# Patient Record
Sex: MALE | Race: OTHER | HISPANIC OR LATINO
[De-identification: names, ages, dates, MRNs, and addresses within clinical notes are randomized per-mention and may not be internally consistent; named-entity substitution may affect disease eponyms.]

---

## 2017-04-10 ENCOUNTER — TRANSCRIPTION ENCOUNTER (OUTPATIENT)
Age: 42
End: 2017-04-10

## 2017-12-06 ENCOUNTER — TRANSCRIPTION ENCOUNTER (OUTPATIENT)
Age: 42
End: 2017-12-06

## 2018-02-25 ENCOUNTER — TRANSCRIPTION ENCOUNTER (OUTPATIENT)
Age: 43
End: 2018-02-25

## 2020-02-14 ENCOUNTER — APPOINTMENT (OUTPATIENT)
Dept: OTOLARYNGOLOGY | Facility: CLINIC | Age: 45
End: 2020-02-14
Payer: MEDICAID

## 2020-02-14 ENCOUNTER — OUTPATIENT (OUTPATIENT)
Dept: OUTPATIENT SERVICES | Facility: HOSPITAL | Age: 45
LOS: 1 days | Discharge: HOME | End: 2020-02-14

## 2020-02-14 VITALS
DIASTOLIC BLOOD PRESSURE: 79 MMHG | BODY MASS INDEX: 29.8 KG/M2 | WEIGHT: 220 LBS | HEART RATE: 83 BPM | HEIGHT: 72 IN | SYSTOLIC BLOOD PRESSURE: 116 MMHG

## 2020-02-14 PROCEDURE — 31575 DIAGNOSTIC LARYNGOSCOPY: CPT

## 2020-02-14 PROCEDURE — 99204 OFFICE O/P NEW MOD 45 MIN: CPT | Mod: 25

## 2020-02-14 NOTE — PROCEDURE
[Unable to Cooperate with Mirror] : patient unable to cooperate with mirror [Obstruction] : acute airway obstruction evaluation [Image(s) Captured] : image(s) captured and filed [Complicated Symptoms] : complicated symptoms requiring more thorough examination than provided by mirror [Oxymetazoline HCl] : oxymetazoline HCl [Topical Lidocaine] : topical lidocaine [Flexible Endoscope] : examined with the flexible endoscope [Serial Number: ___] : Serial Number: [unfilled] [Normal] : normal [Velopharynx ___ %] : the velopharynx was [unfilled]U% collapsed [True Vocal Cords Paralysis] : no true vocal cord paralysis [True Vocal Cords Erythematous] : no true vocal cord edema [True Vocal Cords Schilling's Nodules] : no true vocal cord nodules [Glottis Arytenoid Cartilages] : no arytenoid granulomas [Glottis Arytenoid Cartilages Erythema] : no arytenoid erythema [de-identified] : Patient was placed in the examination chair in a sitting position. The nose was decongested with oxymetazoline nasal solution. The airway was anesthetized with 4% Xylocaine.  The back of the throat was anesthetized with Cetacaine. Direct flexible/rigid video endoscopy was performed. Findings revealed:\par Patient still has a deviated nasal septum to the right anteriorly nasopharynx causes a positive Müller maneuver larynx epiglottis vocal cords base of tongue normal [de-identified] : MARY [FreeTextEntry3] : + Rocha

## 2020-02-14 NOTE — PHYSICAL EXAM
[] : septum deviated to the right [Midline] : trachea located in midline position [Normal] : orientation to person, place, and time: normal [de-identified] : 2+ [de-identified] : Positive Rocha maneuver

## 2020-02-14 NOTE — REASON FOR VISIT
[Initial Consultation] : an initial consultation for [Other: _____] : [unfilled] [FreeTextEntry2] : referred by [facility] for obstructive sleep apnea

## 2020-02-14 NOTE — CONSULT LETTER
[Dear  ___] : Dear  [unfilled], [Please see my note below.] : Please see my note below. [Consult Closing:] : Thank you very much for allowing me to participate in the care of this patient.  If you have any questions, please do not hesitate to contact me. [Consult Letter:] : I had the pleasure of evaluating your patient, [unfilled]. [Sincerely,] : Sincerely, [FreeTextEntry2] : Dr. Mallika Renteria\par [address] [address] [FreeTextEntry3] : Luke Corona MD, NORRIS, FACS\par  Department Otolaryngology\par Director of [facility]\par Professor of Otolaryngology, \par [employer]\par

## 2020-02-14 NOTE — HISTORY OF PRESENT ILLNESS
[de-identified] : 44 year old male referred by [facility] for obstructive sleep apnea.  Patient had a sleep study conducted 9/7/2019 showing  AHI 18.1 with central and mixed apneas less than 25% of total events.  Patient has a BMI of 29.8.  Patient has tried a CPAP machine without relief.  Patient complains of removing mask during sleep.  Patient has tried various types of masks without relief.\par Patient states mask causes claustrophobia, difficulty breathing, dry mouth.  Patient has tried using a dental appliance without relief.  Patient sleeping partner complains about the noise of the CPAP machine, stating it keeps them awake at night.  Patient comorbidities include daytime fatigue, difficulty driving.\par

## 2020-02-14 NOTE — REVIEW OF SYSTEMS
[Nasal Congestion] : nasal congestion [Sneezing] : sneezing [Seasonal Allergies] : seasonal allergies [Sinus Pain] : sinus pain [Problem Snoring] : problem snoring [Sinus Pressure] : sinus pressure [Snoring With Pauses] : snoring with pauses [Sense Of Smell Problem] : sense of smell problem [Throat Clearing] : throat clearing [Shortness Of Breath] : shortness of breath [Cough] : cough [Wheezing] : wheezing [Heartburn] : heartburn [Negative] : Heme/Lymph [de-identified] : noisy breathing [FreeTextEntry9] : muscle aches [FreeTextEntry1] : fatigue, daytime sleepiness

## 2020-02-19 DIAGNOSIS — G47.33 OBSTRUCTIVE SLEEP APNEA (ADULT) (PEDIATRIC): ICD-10-CM

## 2020-02-19 DIAGNOSIS — J34.2 DEVIATED NASAL SEPTUM: ICD-10-CM

## 2020-02-21 ENCOUNTER — OUTPATIENT (OUTPATIENT)
Dept: OUTPATIENT SERVICES | Facility: HOSPITAL | Age: 45
LOS: 1 days | End: 2020-02-21

## 2020-02-21 VITALS
OXYGEN SATURATION: 97 % | SYSTOLIC BLOOD PRESSURE: 134 MMHG | HEIGHT: 72 IN | HEART RATE: 78 BPM | DIASTOLIC BLOOD PRESSURE: 70 MMHG | WEIGHT: 222.01 LBS | RESPIRATION RATE: 16 BRPM | TEMPERATURE: 99 F

## 2020-02-21 DIAGNOSIS — G47.33 OBSTRUCTIVE SLEEP APNEA (ADULT) (PEDIATRIC): ICD-10-CM

## 2020-02-21 DIAGNOSIS — Z98.890 OTHER SPECIFIED POSTPROCEDURAL STATES: Chronic | ICD-10-CM

## 2020-02-21 LAB
ANION GAP SERPL CALC-SCNC: 13 MMO/L — SIGNIFICANT CHANGE UP (ref 7–14)
BUN SERPL-MCNC: 16 MG/DL — SIGNIFICANT CHANGE UP (ref 7–23)
CALCIUM SERPL-MCNC: 9.3 MG/DL — SIGNIFICANT CHANGE UP (ref 8.4–10.5)
CHLORIDE SERPL-SCNC: 103 MMOL/L — SIGNIFICANT CHANGE UP (ref 98–107)
CO2 SERPL-SCNC: 24 MMOL/L — SIGNIFICANT CHANGE UP (ref 22–31)
CREAT SERPL-MCNC: 0.88 MG/DL — SIGNIFICANT CHANGE UP (ref 0.5–1.3)
GLUCOSE SERPL-MCNC: 96 MG/DL — SIGNIFICANT CHANGE UP (ref 70–99)
HCT VFR BLD CALC: 42.2 % — SIGNIFICANT CHANGE UP (ref 39–50)
HGB BLD-MCNC: 13.8 G/DL — SIGNIFICANT CHANGE UP (ref 13–17)
MCHC RBC-ENTMCNC: 30.1 PG — SIGNIFICANT CHANGE UP (ref 27–34)
MCHC RBC-ENTMCNC: 32.7 % — SIGNIFICANT CHANGE UP (ref 32–36)
MCV RBC AUTO: 91.9 FL — SIGNIFICANT CHANGE UP (ref 80–100)
NRBC # FLD: 0 K/UL — SIGNIFICANT CHANGE UP (ref 0–0)
PLATELET # BLD AUTO: 256 K/UL — SIGNIFICANT CHANGE UP (ref 150–400)
PMV BLD: 11.1 FL — SIGNIFICANT CHANGE UP (ref 7–13)
POTASSIUM SERPL-MCNC: 3.5 MMOL/L — SIGNIFICANT CHANGE UP (ref 3.5–5.3)
POTASSIUM SERPL-SCNC: 3.5 MMOL/L — SIGNIFICANT CHANGE UP (ref 3.5–5.3)
RBC # BLD: 4.59 M/UL — SIGNIFICANT CHANGE UP (ref 4.2–5.8)
RBC # FLD: 12.5 % — SIGNIFICANT CHANGE UP (ref 10.3–14.5)
SODIUM SERPL-SCNC: 140 MMOL/L — SIGNIFICANT CHANGE UP (ref 135–145)
WBC # BLD: 8.56 K/UL — SIGNIFICANT CHANGE UP (ref 3.8–10.5)
WBC # FLD AUTO: 8.56 K/UL — SIGNIFICANT CHANGE UP (ref 3.8–10.5)

## 2020-02-21 NOTE — H&P PST ADULT - HISTORY OF PRESENT ILLNESS
44 y.o. male with hx of MARY, reports not able to tolerate CPAP machine, tried various masks as well as dental appliances, presents to PST for evaluation for Drug Induced Sleep Endoscopy on 03/05/2020

## 2020-02-21 NOTE — H&P PST ADULT - NEGATIVE GENERAL GENITOURINARY SYMPTOMS
no incontinence/no hematuria/no renal colic/no dysuria/no flank pain L/no flank pain R/no bladder infections

## 2020-02-21 NOTE — H&P PST ADULT - NSICDXPROBLEM_GEN_ALL_CORE_FT
PROBLEM DIAGNOSES  Problem: MARY (obstructive sleep apnea)  Assessment and Plan: pt scheduled for drug induced sleep endoscopy on 03/05/2020  Preop instructions provided. Pt verbalized understanding.   Pepcid for GI prophylaxis with written and verbal instruction provided PROBLEM DIAGNOSES  Problem: MARY (obstructive sleep apnea)  Assessment and Plan: pt scheduled for drug induced sleep endoscopy on 03/05/2020  Preop instructions provided. Pt verbalized understanding.   Pepcid for GI prophylaxis with written and verbal instruction provided   confirmed MARY, OR booking notified

## 2020-02-21 NOTE — H&P PST ADULT - MUSCULOSKELETAL
details… detailed exam no joint erythema/ROM intact/normal strength/no joint warmth/no calf tenderness/no joint swelling

## 2020-02-21 NOTE — H&P PST ADULT - NEGATIVE NEUROLOGICAL SYMPTOMS
normal... Well appearing, well nourished, awake, alert, oriented to person, place, time/situation and in no apparent distress. no generalized seizures/no focal seizures

## 2020-03-04 ENCOUNTER — TRANSCRIPTION ENCOUNTER (OUTPATIENT)
Age: 45
End: 2020-03-04

## 2020-03-05 ENCOUNTER — APPOINTMENT (OUTPATIENT)
Dept: OTOLARYNGOLOGY | Facility: AMBULATORY SURGERY CENTER | Age: 45
End: 2020-03-05

## 2020-03-05 ENCOUNTER — OUTPATIENT (OUTPATIENT)
Dept: OUTPATIENT SERVICES | Facility: HOSPITAL | Age: 45
LOS: 1 days | Discharge: HOME | End: 2020-03-05
Payer: MEDICAID

## 2020-03-05 VITALS
DIASTOLIC BLOOD PRESSURE: 68 MMHG | SYSTOLIC BLOOD PRESSURE: 106 MMHG | TEMPERATURE: 98 F | HEART RATE: 86 BPM | RESPIRATION RATE: 15 BRPM | OXYGEN SATURATION: 98 %

## 2020-03-05 VITALS
HEIGHT: 72 IN | OXYGEN SATURATION: 100 % | TEMPERATURE: 98 F | HEART RATE: 75 BPM | RESPIRATION RATE: 16 BRPM | WEIGHT: 222.01 LBS | SYSTOLIC BLOOD PRESSURE: 116 MMHG | DIASTOLIC BLOOD PRESSURE: 69 MMHG

## 2020-03-05 DIAGNOSIS — G47.33 OBSTRUCTIVE SLEEP APNEA (ADULT) (PEDIATRIC): ICD-10-CM

## 2020-03-05 DIAGNOSIS — Z98.890 OTHER SPECIFIED POSTPROCEDURAL STATES: Chronic | ICD-10-CM

## 2020-03-05 PROCEDURE — 31575 DIAGNOSTIC LARYNGOSCOPY: CPT

## 2020-03-05 NOTE — ASU PREOP CHECKLIST - SURGICAL CONSENT
What Type Of Note Output Would You Prefer (Optional)?: Bullet Format How Severe Is Your Rash?: moderate Is This A New Presentation, Or A Follow-Up?: Rash done

## 2020-03-05 NOTE — ASU PREOP CHECKLIST - BP NONINVASIVE SYSTOLIC (MM HG)
Symptomatic status post ER visit ultrasound of the right upper quadrant her positive for calculus but no cholecystitis patient asymptomatic with abdomen pain proper diet low fat diet low greasy food discussed with the patient will refer her to see general surgeon [id number]

## 2020-06-13 ENCOUNTER — OUTPATIENT (OUTPATIENT)
Dept: OUTPATIENT SERVICES | Facility: HOSPITAL | Age: 45
LOS: 1 days | End: 2020-06-13
Payer: MEDICAID

## 2020-06-13 DIAGNOSIS — Z98.890 OTHER SPECIFIED POSTPROCEDURAL STATES: Chronic | ICD-10-CM

## 2020-06-13 DIAGNOSIS — Z11.59 ENCOUNTER FOR SCREENING FOR OTHER VIRAL DISEASES: ICD-10-CM

## 2020-06-13 LAB — SARS-COV-2 RNA SPEC QL NAA+PROBE: SIGNIFICANT CHANGE UP

## 2020-06-13 PROCEDURE — U0003: CPT

## 2020-06-15 ENCOUNTER — TRANSCRIPTION ENCOUNTER (OUTPATIENT)
Age: 45
End: 2020-06-15

## 2020-06-15 VITALS
HEIGHT: 72 IN | TEMPERATURE: 97 F | DIASTOLIC BLOOD PRESSURE: 72 MMHG | OXYGEN SATURATION: 99 % | RESPIRATION RATE: 18 BRPM | SYSTOLIC BLOOD PRESSURE: 114 MMHG | HEART RATE: 60 BPM | WEIGHT: 216.05 LBS

## 2020-06-15 RX ORDER — SODIUM CHLORIDE 9 MG/ML
3 INJECTION INTRAMUSCULAR; INTRAVENOUS; SUBCUTANEOUS EVERY 8 HOURS
Refills: 0 | Status: DISCONTINUED | OUTPATIENT
Start: 2020-06-16 | End: 2020-07-01

## 2020-06-15 RX ORDER — LIDOCAINE HCL 20 MG/ML
0.2 VIAL (ML) INJECTION ONCE
Refills: 0 | Status: DISCONTINUED | OUTPATIENT
Start: 2020-06-16 | End: 2020-07-01

## 2020-06-15 NOTE — H&P PST ADULT - NSICDXPROBLEM_GEN_ALL_CORE_FT
PROBLEM DIAGNOSES  Problem: Obstructive sleep apnea  Assessment and Plan: pt scheduled for incision for implantation of cranial nerve stimulator electrode array & pulse generator .Insertion of chest wall respiratory sensor electrode or electrode array including connection to pulse generator  Preop instructions provided. Pt verbalized understanding.   Confirmed MARY, OR booking notified

## 2020-06-15 NOTE — H&P PST ADULT - NSICDXPASTSURGICALHX_GEN_ALL_CORE_FT
PAST SURGICAL HISTORY:  H/O arthroscopy right shoulder rotator cuff repair    H/O nasal septoplasty     History of ankle surgery left for torn ligament

## 2020-06-15 NOTE — H&P PST ADULT - HISTORY OF PRESENT ILLNESS
44 year old male with hx of MARY, reports not able to tolerate CPAP machine, tried various masks as well as dental appliances, s/p Drug Induced Sleep Endoscopy on 03/05/2020 & , presents for implantation of cranial nerve stimulator electrode array & pulse generator placement on 06/16/20.    ***S/p Covid-19 PCR negative on 06/13/20 44 year old male with hx of MARY, reports not able to tolerate CPAP machine, tried various masks as well as dental appliances, s/p Drug Induced Sleep Endoscopy on 03/05/2020 & , presents for implantation of cranial nerve stimulator electrode array & pulse generator placement on 06/16/20.    ***S/p Covid-19 PCR negative on 06/13/20 6/16/2020, last meal 10:45 PM on 6/15/2020

## 2020-06-16 ENCOUNTER — APPOINTMENT (OUTPATIENT)
Dept: OTOLARYNGOLOGY | Facility: HOSPITAL | Age: 45
End: 2020-06-16

## 2020-06-16 ENCOUNTER — OUTPATIENT (OUTPATIENT)
Dept: OUTPATIENT SERVICES | Facility: HOSPITAL | Age: 45
LOS: 1 days | End: 2020-06-16
Payer: MEDICAID

## 2020-06-16 ENCOUNTER — RESULT REVIEW (OUTPATIENT)
Age: 45
End: 2020-06-16

## 2020-06-16 VITALS
SYSTOLIC BLOOD PRESSURE: 102 MMHG | HEART RATE: 76 BPM | RESPIRATION RATE: 15 BRPM | DIASTOLIC BLOOD PRESSURE: 56 MMHG | OXYGEN SATURATION: 99 % | TEMPERATURE: 98 F

## 2020-06-16 DIAGNOSIS — G47.33 OBSTRUCTIVE SLEEP APNEA (ADULT) (PEDIATRIC): ICD-10-CM

## 2020-06-16 DIAGNOSIS — Z98.890 OTHER SPECIFIED POSTPROCEDURAL STATES: Chronic | ICD-10-CM

## 2020-06-16 DIAGNOSIS — Z01.818 ENCOUNTER FOR OTHER PREPROCEDURAL EXAMINATION: ICD-10-CM

## 2020-06-16 LAB
HCT VFR BLD CALC: 43.5 % — SIGNIFICANT CHANGE UP (ref 39–50)
HGB BLD-MCNC: 15.1 G/DL — SIGNIFICANT CHANGE UP (ref 13–17)
MCHC RBC-ENTMCNC: 30.4 PG — SIGNIFICANT CHANGE UP (ref 27–34)
MCHC RBC-ENTMCNC: 34.7 GM/DL — SIGNIFICANT CHANGE UP (ref 32–36)
MCV RBC AUTO: 87.5 FL — SIGNIFICANT CHANGE UP (ref 80–100)
NRBC # BLD: 0 /100 WBCS — SIGNIFICANT CHANGE UP (ref 0–0)
PLATELET # BLD AUTO: 216 K/UL — SIGNIFICANT CHANGE UP (ref 150–400)
RBC # BLD: 4.97 M/UL — SIGNIFICANT CHANGE UP (ref 4.2–5.8)
RBC # FLD: 12.9 % — SIGNIFICANT CHANGE UP (ref 10.3–14.5)
WBC # BLD: 8.49 K/UL — SIGNIFICANT CHANGE UP (ref 3.8–10.5)
WBC # FLD AUTO: 8.49 K/UL — SIGNIFICANT CHANGE UP (ref 3.8–10.5)

## 2020-06-16 PROCEDURE — 71045 X-RAY EXAM CHEST 1 VIEW: CPT | Mod: 26

## 2020-06-16 PROCEDURE — 64568 OPN IMPLTJ CRNL NRV NEA&PG: CPT

## 2020-06-16 PROCEDURE — 85027 COMPLETE CBC AUTOMATED: CPT

## 2020-06-16 PROCEDURE — C1778: CPT

## 2020-06-16 PROCEDURE — C1889: CPT

## 2020-06-16 PROCEDURE — 0466T INSRT CH WALL RESPIR ELTRD/RA & CONJ PULSE GEN: CPT

## 2020-06-16 PROCEDURE — 71045 X-RAY EXAM CHEST 1 VIEW: CPT

## 2020-06-16 PROCEDURE — C1787: CPT

## 2020-06-16 PROCEDURE — C1767: CPT

## 2020-06-16 PROCEDURE — 0466T: CPT

## 2020-06-16 RX ORDER — CELECOXIB 200 MG/1
200 CAPSULE ORAL ONCE
Refills: 0 | Status: DISCONTINUED | OUTPATIENT
Start: 2020-06-16 | End: 2020-07-01

## 2020-06-16 RX ORDER — CEPHALEXIN 500 MG
1 CAPSULE ORAL
Qty: 20 | Refills: 0
Start: 2020-06-16 | End: 2020-06-25

## 2020-06-16 RX ORDER — HYDROMORPHONE HYDROCHLORIDE 2 MG/ML
0.5 INJECTION INTRAMUSCULAR; INTRAVENOUS; SUBCUTANEOUS
Refills: 0 | Status: DISCONTINUED | OUTPATIENT
Start: 2020-06-16 | End: 2020-06-16

## 2020-06-16 RX ORDER — CHLORHEXIDINE GLUCONATE 213 G/1000ML
1 SOLUTION TOPICAL ONCE
Refills: 0 | Status: COMPLETED | OUTPATIENT
Start: 2020-06-16 | End: 2020-06-16

## 2020-06-16 RX ORDER — OXYCODONE HYDROCHLORIDE 5 MG/1
1 TABLET ORAL
Qty: 15 | Refills: 0
Start: 2020-06-16

## 2020-06-16 RX ORDER — SODIUM CHLORIDE 9 MG/ML
1000 INJECTION, SOLUTION INTRAVENOUS
Refills: 0 | Status: DISCONTINUED | OUTPATIENT
Start: 2020-06-16 | End: 2020-07-01

## 2020-06-16 RX ORDER — ONDANSETRON 8 MG/1
4 TABLET, FILM COATED ORAL ONCE
Refills: 0 | Status: DISCONTINUED | OUTPATIENT
Start: 2020-06-16 | End: 2020-07-01

## 2020-06-16 RX ORDER — CEFAZOLIN SODIUM 1 G
2000 VIAL (EA) INJECTION ONCE
Refills: 0 | Status: COMPLETED | OUTPATIENT
Start: 2020-06-16 | End: 2020-06-16

## 2020-06-16 RX ORDER — OXYCODONE HYDROCHLORIDE 5 MG/1
5 TABLET ORAL ONCE
Refills: 0 | Status: DISCONTINUED | OUTPATIENT
Start: 2020-06-16 | End: 2020-06-16

## 2020-06-16 RX ADMIN — HYDROMORPHONE HYDROCHLORIDE 0.5 MILLIGRAM(S): 2 INJECTION INTRAMUSCULAR; INTRAVENOUS; SUBCUTANEOUS at 12:36

## 2020-06-16 RX ADMIN — CHLORHEXIDINE GLUCONATE 1 APPLICATION(S): 213 SOLUTION TOPICAL at 07:01

## 2020-06-16 RX ADMIN — OXYCODONE HYDROCHLORIDE 5 MILLIGRAM(S): 5 TABLET ORAL at 12:49

## 2020-06-16 NOTE — ASU DISCHARGE PLAN (ADULT/PEDIATRIC) - ASU DC SPECIAL INSTRUCTIONSFT
1. wear sling for one week , do not reach over head for one week with right arm.    2. follow-up in ENT office for wound check in 7-10 days

## 2020-06-16 NOTE — ASU PATIENT PROFILE, ADULT - PSH
H/O arthroscopy  right shoulder rotator cuff repair  H/O nasal septoplasty    History of ankle surgery  left for torn ligament

## 2020-06-16 NOTE — ASU DISCHARGE PLAN (ADULT/PEDIATRIC) - PAIN MANAGEMENT
Prescriptions electronically submitted to pharmacy from [facility]/sent to local [facility] pharamcy in [place] on file

## 2020-06-16 NOTE — ASU PATIENT PROFILE, ADULT - PMH
MARY (obstructive sleep apnea)  not able to tolerate c-pap, s/p Endoscopical eval 3/20  Smoking  10 pack per year

## 2020-06-17 PROBLEM — F17.200 NICOTINE DEPENDENCE, UNSPECIFIED, UNCOMPLICATED: Chronic | Status: ACTIVE | Noted: 2020-06-15

## 2020-06-17 PROBLEM — G47.33 OBSTRUCTIVE SLEEP APNEA (ADULT) (PEDIATRIC): Chronic | Status: ACTIVE | Noted: 2020-02-21

## 2020-06-24 ENCOUNTER — APPOINTMENT (OUTPATIENT)
Dept: OTOLARYNGOLOGY | Facility: CLINIC | Age: 45
End: 2020-06-24
Payer: MEDICAID

## 2020-06-24 PROCEDURE — 99024 POSTOP FOLLOW-UP VISIT: CPT

## 2020-06-24 NOTE — PHYSICAL EXAM
[Normal] : no mass and no adenopathy [de-identified] : neck , chest and abdominal incision clean dry intact no edema no erythema no exudates three dressing removed. [de-identified] : serosanguineous

## 2020-06-24 NOTE — REASON FOR VISIT
[Post-Operative Visit] : a post-operative visit [FreeTextEntry2] : wound evaluation [FreeTextEntry1] : S/P INSPIRE implantation

## 2020-07-06 DIAGNOSIS — Z09 ENCOUNTER FOR FOLLOW-UP EXAMINATION AFTER COMPLETED TREATMENT FOR CONDITIONS OTHER THAN MALIGNANT NEOPLASM: ICD-10-CM

## 2020-07-08 ENCOUNTER — APPOINTMENT (OUTPATIENT)
Dept: PULMONOLOGY | Facility: CLINIC | Age: 45
End: 2020-07-08

## 2020-07-21 ENCOUNTER — APPOINTMENT (OUTPATIENT)
Dept: PULMONOLOGY | Facility: CLINIC | Age: 45
End: 2020-07-21
Payer: MEDICAID

## 2020-07-21 VITALS
HEIGHT: 72 IN | RESPIRATION RATE: 16 BRPM | OXYGEN SATURATION: 93 % | WEIGHT: 210.5 LBS | SYSTOLIC BLOOD PRESSURE: 136 MMHG | DIASTOLIC BLOOD PRESSURE: 84 MMHG | TEMPERATURE: 97.6 F | BODY MASS INDEX: 28.51 KG/M2 | HEART RATE: 97 BPM

## 2020-07-21 PROCEDURE — 99214 OFFICE O/P EST MOD 30 MIN: CPT | Mod: GC,25

## 2020-07-21 PROCEDURE — 95976 ALYS SMPL CN NPGT PRGRMG: CPT | Mod: GC

## 2020-08-05 NOTE — PHYSICAL EXAM
[General Appearance - Well Developed] : well developed [General Appearance - Well Nourished] : well nourished [Normal Conjunctiva] : the conjunctiva exhibited no abnormalities [Neck Appearance] : the appearance of the neck was normal [Neck Cervical Mass (___cm)] : no neck mass was observed [Heart Rate And Rhythm] : heart rate was normal and rhythm regular [Heart Sounds] : normal S1 and S2 [Exaggerated Use Of Accessory Muscles For Inspiration] : no accessory muscle use [Abnormal Walk] : normal gait [Cyanosis, Localized] : no localized cyanosis [Nail Clubbing] : no clubbing of the fingernails [Skin Color & Pigmentation] : normal skin color and pigmentation [] : no rash [Motor Exam] : the motor exam was normal [No Focal Deficits] : no focal deficits [Oriented To Time, Place, And Person] : oriented to person, place, and time [Mood] : the mood was normal [FreeTextEntry1] : good tongue movement

## 2020-08-05 NOTE — REASON FOR VISIT
[Initial Evaluation] : an initial evaluation [Sleep Apnea] : sleep apnea [FreeTextEntry2] : Inspire activation and programming

## 2020-08-05 NOTE — HISTORY OF PRESENT ILLNESS
[Obstructive Sleep Apnea] : obstructive sleep apnea [FreeTextEntry1] : 44M hx MARY s/p INSPIRE, who comes for programming. He was diagnosed with moderate MARY in 9/2019 and was given CPAP, which patient did not tolerate because of uncomfortable mask, claustrophobia, SOB and dry mouth. OAT done as well but did not help. He continued to be symptomatic. Saw Dr. Corona on 2/2020, and INSPIRE was placed on 6/16/2020. During the follow up visit, site was healing well. Some tongue discomfort when moving it to the right, but otherwise no issues with tongue movement.\par \par Diagnostic PSG at [facility] on 9/7/2019 with AHI 18.1, majority were obstructive. T90 1.2%. \par \par Sleep schedule: 11PM to bed, sleep latency 30-60 min, wakes up at 4AM, snoring, EDS.

## 2020-08-05 NOTE — PROCEDURE
[FreeTextEntry1] :  In office activation and programming was performed\par Sensory threshold of 0.7 V and functional threshold of 2.4 V, set to a range of 3.6-4.6 V\par Start delay: 30 mins, Pause time: 15 mins, Therapy duration: 7 hours

## 2020-08-05 NOTE — REVIEW OF SYSTEMS
[EDS: ESS=____] : daytime somnolence: ESS=[unfilled] [Fatigue] : fatigue [Nasal Congestion] : nasal congestion [Snoring] : snoring [Difficulty Maintaining Sleep] : difficulty maintaining sleep [Negative] : Psychiatric [Difficulty Initiating Sleep] : no difficulty falling asleep [Lower Extremity Discomfort] : no lower extremity discomfort [Late day/ Evening symptoms] : no late day/evening symptoms [Unusual Sleep Behavior] : no unusual sleep behavior

## 2020-08-05 NOTE — ASSESSMENT
[FreeTextEntry1] : 44M hx MARY s/p INSPIRE, who comes for programming. He was diagnosed with moderate MARY (AHI 18.1) in 9/2019 and was given CPAP, which patient did not tolerate because of uncomfortable mask, claustrophobia, SOB and dry mouth. Also did not tolerate oral appliance. Now s/p INSPIRE on 6/16/2020 by Dr. Corona. \par \par - Patient had an AHI of 18.1 events/hr on 9/7/2019\par - Patient went for INSPIRE implantation on 6/16/2020 with Dr. Corona\par - No complications with procedure and incision sites appear C/D/I\par - In office activation was performed\par Sensory threshold of 0.7 V and functional threshold of 2.4 V, set to a range of 3.6-4.6 V\par Start delay: 30 mins, Pause time: 15 mins, Therapy duration: 7 hours\par \par - Patient was explained how to use the device and how he will be titrating the device independently, patient understood\par - Ultrasound evaluation of tongue motion showed appropriate anterior excursion of the hyoid bone\par - Will plan for an HSAT after 6-8 weeks of treatment\par - will follow up in the office with us on 8/25/2020\par \par \par Sonja Wisdom MD\par Sleep Fellow\par

## 2020-08-25 ENCOUNTER — APPOINTMENT (OUTPATIENT)
Dept: PULMONOLOGY | Facility: CLINIC | Age: 45
End: 2020-08-25

## 2020-10-02 NOTE — ASU PREOP CHECKLIST - AS TEMP SITE
PT PRESENTING TO ER FOR BLOODY VOMIT X1 TODAY, PT WAS INITIALLY TACHY AND HYPOTENSIVE. PT DENIES ABD PAIN, BLOOD THINNERS AND NAUSEA. CONNECTED TO MONITORING, VSS. IV IN PLACE, LABS SENT. CALL LIGHT WITHIN REACH   forehead

## 2020-10-07 ENCOUNTER — EMERGENCY (EMERGENCY)
Facility: HOSPITAL | Age: 45
LOS: 0 days | Discharge: HOME | End: 2020-10-07
Payer: MEDICAID

## 2020-10-07 VITALS
HEART RATE: 89 BPM | RESPIRATION RATE: 17 BRPM | TEMPERATURE: 99 F | SYSTOLIC BLOOD PRESSURE: 122 MMHG | OXYGEN SATURATION: 99 % | HEIGHT: 72 IN | DIASTOLIC BLOOD PRESSURE: 76 MMHG

## 2020-10-07 DIAGNOSIS — Z20.828 CONTACT WITH AND (SUSPECTED) EXPOSURE TO OTHER VIRAL COMMUNICABLE DISEASES: ICD-10-CM

## 2020-10-07 DIAGNOSIS — Z98.890 OTHER SPECIFIED POSTPROCEDURAL STATES: Chronic | ICD-10-CM

## 2020-10-07 PROCEDURE — 99283 EMERGENCY DEPT VISIT LOW MDM: CPT

## 2020-10-07 PROCEDURE — U0003: CPT

## 2020-10-07 NOTE — ED STATDOCS - PATIENT PORTAL LINK FT
You can access the FollowMyHealth Patient Portal offered by [facility] by registering at the following website: [email or web address]. By joining [employer]’s FollowMyHealth portal, you will also be able to view your health information using other applications (apps) compatible with our system.

## 2020-10-07 NOTE — ED STATDOCS - PHYSICAL EXAMINATION
Constitutional: NAD AAOx3. Nontoxic, well appearing. Speaking full sentences  w/o distress  Head: Normocephalic atraumatic  Mouth: no airway obstruction  Cardiac: [id number]   Resp: Lungs CTA B/L  Abd: soft, nd. NTTP. No r/g/r.   Neuro: motor and sensory intact

## 2020-10-08 LAB — SARS-COV-2 RNA SPEC QL NAA+PROBE: SIGNIFICANT CHANGE UP

## 2020-10-21 NOTE — PRE-ANESTHESIA EVALUATION ADULT - LAST STRESS TEST
Debridement Text: The wound edges were debrided prior to proceeding with the closure to facilitate wound healing. denies

## 2021-01-31 ENCOUNTER — OUTPATIENT (OUTPATIENT)
Dept: OUTPATIENT SERVICES | Facility: HOSPITAL | Age: 46
LOS: 1 days | End: 2021-01-31
Payer: MEDICAID

## 2021-01-31 DIAGNOSIS — Z98.890 OTHER SPECIFIED POSTPROCEDURAL STATES: Chronic | ICD-10-CM

## 2021-01-31 DIAGNOSIS — Z20.828 CONTACT WITH AND (SUSPECTED) EXPOSURE TO OTHER VIRAL COMMUNICABLE DISEASES: ICD-10-CM

## 2021-01-31 LAB — SARS-COV-2 RNA SPEC QL NAA+PROBE: SIGNIFICANT CHANGE UP

## 2021-01-31 PROCEDURE — U0003: CPT

## 2021-01-31 PROCEDURE — C9803: CPT

## 2021-01-31 PROCEDURE — U0005: CPT

## 2021-02-01 DIAGNOSIS — Z20.828 CONTACT WITH AND (SUSPECTED) EXPOSURE TO OTHER VIRAL COMMUNICABLE DISEASES: ICD-10-CM

## 2021-03-05 ENCOUNTER — OUTPATIENT (OUTPATIENT)
Dept: OUTPATIENT SERVICES | Facility: HOSPITAL | Age: 46
LOS: 1 days | Discharge: HOME | End: 2021-03-05

## 2021-03-05 ENCOUNTER — APPOINTMENT (OUTPATIENT)
Dept: OTOLARYNGOLOGY | Facility: CLINIC | Age: 46
End: 2021-03-05
Payer: MEDICAID

## 2021-03-05 VITALS
HEART RATE: 79 BPM | DIASTOLIC BLOOD PRESSURE: 72 MMHG | HEIGHT: 72 IN | SYSTOLIC BLOOD PRESSURE: 113 MMHG | BODY MASS INDEX: 28.44 KG/M2 | WEIGHT: 210 LBS

## 2021-03-05 DIAGNOSIS — J34.2 DEVIATED NASAL SEPTUM: ICD-10-CM

## 2021-03-05 DIAGNOSIS — J34.89 OTHER SPECIFIED DISORDERS OF NOSE AND NASAL SINUSES: ICD-10-CM

## 2021-03-05 DIAGNOSIS — J34.3 HYPERTROPHY OF NASAL TURBINATES: ICD-10-CM

## 2021-03-05 DIAGNOSIS — Z98.890 OTHER SPECIFIED POSTPROCEDURAL STATES: Chronic | ICD-10-CM

## 2021-03-05 DIAGNOSIS — F17.200 NICOTINE DEPENDENCE, UNSPECIFIED, UNCOMPLICATED: ICD-10-CM

## 2021-03-05 DIAGNOSIS — Z78.9 OTHER SPECIFIED HEALTH STATUS: ICD-10-CM

## 2021-03-05 PROCEDURE — 31231 NASAL ENDOSCOPY DX: CPT

## 2021-03-05 PROCEDURE — 99072 ADDL SUPL MATRL&STAF TM PHE: CPT

## 2021-03-05 PROCEDURE — 99214 OFFICE O/P EST MOD 30 MIN: CPT | Mod: 25

## 2021-03-05 RX ORDER — OXYCODONE AND ACETAMINOPHEN 5; 325 MG/1; MG/1
5-325 TABLET ORAL
Qty: 10 | Refills: 0 | Status: DISCONTINUED | COMMUNITY
Start: 2020-06-19 | End: 2021-03-05

## 2021-03-05 NOTE — REASON FOR VISIT
[Subsequent Evaluation] : a subsequent evaluation for [FreeTextEntry2] : follow up s/p Inspire implant 6/16/2020, here for nasal congestion, snoring

## 2021-03-05 NOTE — CONSULT LETTER
[Dear  ___] : Dear  [unfilled], [Courtesy Letter:] : I had the pleasure of seeing your patient, [unfilled], in my office today. [Please see my note below.] : Please see my note below. [Consult Closing:] : Thank you very much for allowing me to participate in the care of this patient.  If you have any questions, please do not hesitate to contact me. [Sincerely,] : Sincerely, [FreeTextEntry3] : Luke Corona MD, NORRIS, FACS\par  Department Otolaryngology\par Director of [facility]\par Professor of Otolaryngology, \par [employer]\par

## 2021-03-05 NOTE — PHYSICAL EXAM
[] : septum deviated to the right [Midline] : trachea located in midline position [Normal] : no rashes [de-identified] : 2+ [de-identified] : Positive Rocha maneuver

## 2021-03-05 NOTE — PROCEDURE
[Image(s) Captured] : image(s) captured and filed [Video Captured] : video captured and filed [Topical Lidocaine] : topical lidocaine [Oxymetazoline HCl] : oxymetazoline HCl [Flexible Endoscope] : examined with the flexible endoscope [Serial Number: ___] : Serial Number: [unfilled] [Recalcitrant Symptoms] : recalcitrant symptoms  [Anatomical Abnormality] : anatomical abnormality [Anterior rhinoscopy insufficient to account for symptoms] : anterior rhinoscopy insufficient to account for symptoms [Congested] : congested [Allergic] : allergic signs [___ % Obstructed] : [unfilled]U% obstructed [Deviated to the Rt] : deviated to the right [Normal] : the nasopharynx was normal [Paranasal Sinuses Maxillary Sinus] : no maxillary polyps [Paranasal Sinuses Ethmoid Sinus] : no ethmoid polyps [Paranasal Sinuses Sphenoid Sinus] : no spenoid polyps [FreeTextEntry6] : Pre-op indication(s): Nasal obstruction\par Post-op indication(s): Re deviated nasal septum \par Verbal consent obtained from patient.\par “Anterior rhinoscopy insufficient to account for symptoms” \par Details for procedure: \par Scope #: [id number]\par Type of scope:    flexible fiber optic telescope  X   Rigid glass telescope \par Anesthesia and/or vasoconstriction was achieved topically by using: \par 4% Lidocaine spray   0.05% Oxymetazoline     Other ______ \par The following anatomic sites were directly examined in a sequential fashion: \par The scope was introduced in the nasal passage between the middle and inferior turbinates to exam the inferior portion of the middle meatus and the fontanelle, as well as the maxillary ostia. Next, the scope was passed medially and posteriorly to the middle turbinates to examine the sphenoethmoid recess and the superior turbinate region. \par Upon visualization the finders are as follows: \par Nasal Septum:     Deviated to  right\par Bleeding site cauterized:    Anterior   left   right   Posterior   left   right \par Method:   Silver Nitrate   YAG Laser    Electrocautery ______ \par Right Side: \par * Mucosa: Normal\par * Mucous: Normal\par * Polyp: Normal\par * Inferior Turbinate: Hypertrophy\par * Middle Turbinate: Normal\par * Superior Turbinate: Normal\par * Inferior Meatus: Normal\par * Middle Meatus: Normal\par * Super Meatus: Normal\par * Sphenoethmoidal Recess: Normal\par Left Side: \par * Mucosa: Normal\par * Mucous: Normal\par * Polyp: Normal\par * Inferior Turbinate: Hypertophy\par * Middle Turbinate: Normal\par * Superior Turbinate: Normal\par * Inferior Meatus: Normal\par * Middle Meatus: Normal\par * Super Meatus: Normal\par * Sphenoethmoidal Recess: Normal\par The patient tolerated the procedure well without any complications.\par \par \par

## 2021-03-05 NOTE — HISTORY OF PRESENT ILLNESS
[de-identified] : 45 year old male follow up s/p Inspire implant 6/16/2020, here for nasal congestion, snoring.  States nasal congestion for the past 6 months.  Denies sinus pain, sinus pressure, post nasal drip or nasal discharge.  Denies sinus infections in the past year.  Denies use of steroidal nasal sprays.  States his wife has told him he has been snoring same as prior to the implantation.

## 2021-03-16 DIAGNOSIS — G47.33 OBSTRUCTIVE SLEEP APNEA (ADULT) (PEDIATRIC): ICD-10-CM

## 2021-03-16 DIAGNOSIS — J34.2 DEVIATED NASAL SEPTUM: ICD-10-CM

## 2021-03-16 DIAGNOSIS — J34.89 OTHER SPECIFIED DISORDERS OF NOSE AND NASAL SINUSES: ICD-10-CM

## 2021-03-16 DIAGNOSIS — J34.3 HYPERTROPHY OF NASAL TURBINATES: ICD-10-CM

## 2021-04-08 ENCOUNTER — APPOINTMENT (OUTPATIENT)
Dept: PULMONOLOGY | Facility: CLINIC | Age: 46
End: 2021-04-08
Payer: MEDICAID

## 2021-04-08 VITALS
TEMPERATURE: 97.8 F | DIASTOLIC BLOOD PRESSURE: 78 MMHG | HEART RATE: 96 BPM | RESPIRATION RATE: 16 BRPM | HEIGHT: 72 IN | OXYGEN SATURATION: 97 % | SYSTOLIC BLOOD PRESSURE: 119 MMHG | BODY MASS INDEX: 29.31 KG/M2 | WEIGHT: 216.38 LBS

## 2021-04-08 PROCEDURE — 99214 OFFICE O/P EST MOD 30 MIN: CPT | Mod: GC,25

## 2021-04-08 PROCEDURE — 95976 ALYS SMPL CN NPGT PRGRMG: CPT | Mod: GC

## 2021-04-08 PROCEDURE — 99072 ADDL SUPL MATRL&STAF TM PHE: CPT

## 2021-04-08 NOTE — PHYSICAL EXAM
[General Appearance - Well Developed] : well developed [General Appearance - Well Nourished] : well nourished [Normal Conjunctiva] : the conjunctiva exhibited no abnormalities [Heart Rate And Rhythm] : heart rate was normal and rhythm regular [Heart Sounds] : normal S1 and S2 [Exaggerated Use Of Accessory Muscles For Inspiration] : no accessory muscle use [Abnormal Walk] : normal gait [Nail Clubbing] : no clubbing of the fingernails [Cyanosis, Localized] : no localized cyanosis [Skin Color & Pigmentation] : normal skin color and pigmentation [] : no rash [Motor Exam] : the motor exam was normal [No Focal Deficits] : no focal deficits [Oriented To Time, Place, And Person] : oriented to person, place, and time [Mood] : the mood was normal [FreeTextEntry1] : good tongue movement

## 2021-04-08 NOTE — ASSESSMENT
[FreeTextEntry1] : 45M hx MARY s/p INSPIRE, who comes for follow up. He was diagnosed with moderate MARY (AHI 18.1) in 9/2019 and was given CPAP, which patient did not tolerate because of uncomfortable mask, claustrophobia, SOB and dry mouth. Also did not tolerate oral appliance. Now s/p INSPIRE on 6/16/2020 by Dr. Corona. \par \par - Patient had an AHI of 18.1 events/hr on 9/7/2019\par - Patient went for INSPIRE implantation on 6/16/2020 with Dr. Corona\par - No complications with procedure and incision sites appear C/D/I\par - In office activation was performed on 7/21/2020\par \par - Settings changed during today's visit as follows:\par Sensory threshold of 0.7 V\par Functional threshold of 2.4 V\par Range 3.6-4.6 V to 0.8-1.8V\par Start delay: 30 mins to 15 min\par Pause time: 15 mins\par Therapy duration: 7 to 6 hours\par Pulse Width: 90 us\par Rate: 33 Hz\par Electrode: +-+ to - - -\par \par - Ultrasound evaluation of tongue motion showed appropriate anterior excursion of the hyoid bone with - - - configuration. On both +-+ and 0-0, tongue deviated to left though had good forward movement of hyoid bone.\par - Patient was explained how to use the device and how he will be titrating the device independently, patient understood\par - will order HST once patient is able to tolerate new inspire settings to reevaluate sleep apnea

## 2021-04-08 NOTE — PROCEDURE
[FreeTextEntry1] : Impedances and sensory leads checked today. US used to evaluate hyoid bone excursion and base of tongue. Settings were changed according to patient's complaints. \par \par Sensory threshold of 0.7 V\par Functional threshold of 2.4 V\par Range 3.6-4.6 V to 0.8-1.8V\par Start delay: 30 mins to 15 min\par Pause time: 15 mins\par Therapy duration: 7 to 6 hours\par Pulse Width: 90 us\par Rate: 33 Hz\par Electrode: +-+ to - - -

## 2021-04-08 NOTE — HISTORY OF PRESENT ILLNESS
[FreeTextEntry1] : 45M hx MARY s/p INSPIRE, who comes for follow up. Pt was last seen on 7/21/2020 for activation. Since then states that he has continued to snore. Recently seen by Dr. Corona for nasal congestion and obstruction. s/p septoplasty but during the office visit, pt still had significantly deviated septum (right) with near 100% obstruction compensatory turbinate hypertrophy. Given Nasonex for relief. \par \par Overall, has been trying to use inspire but still wakes up feeling tired/sleepy during the day and snoring heavily as per his bed partner. Over the past week, he has only used it for 4 hours. Has not regularly used it in 2 weeks. Currently on 4.6V. \par \par Current Settings: \par Sensory threshold of 0.7 V\par Functional threshold of 2.4 V\par Range 3.6-4.6 V\par Start delay: 30 mins\par Pause time: 15 mins\par Therapy duration: 7 hours\par Pulse Width: 90 us\par Rate: 33 Hz\par Electrode: +-+\par \par PSG 9/7/2019: AHI 18.1, majority were obstructive. T90 1.2%.

## 2021-05-28 ENCOUNTER — APPOINTMENT (OUTPATIENT)
Dept: DISASTER EMERGENCY | Facility: OTHER | Age: 46
End: 2021-05-28

## 2021-10-26 NOTE — H&P PST ADULT - NSICDXPASTSURGICALHX_GEN_ALL_CORE_FT
Health Maintenance Due   Topic Date Due   • DTaP/Tdap/Td Vaccine (1 - Tdap) Never done   • Shingles Vaccine (1 of 2) Never done   • Colorectal Cancer Screen-  Never done   • Hepatitis C Screening  Never done   • Influenza Vaccine (1) Never done       Patient is due for topics as listed above but is not proceeding with Immunization(s) Dtap/Tdap/Td, Influenza and Shingles, Colorectal Cancer Screening: Colonoscopy and Hepatitis C Screening at this time. Education provided for Immunization(s) Dtap/Tdap/Td, Influenza and Shingles, Colorectal Cancer Screening: Colonoscopy and Hepatitis C Screening. Patient comes in for tick bite left leg            PAST SURGICAL HISTORY:  H/O arthroscopy right shoulder rotator cuff repair    History of ankle surgery left for torn ligament PAST SURGICAL HISTORY:  H/O arthroscopy right shoulder rotator cuff repair    H/O nasal septoplasty     History of ankle surgery left for torn ligament

## 2022-02-02 ENCOUNTER — APPOINTMENT (OUTPATIENT)
Dept: OTOLARYNGOLOGY | Facility: CLINIC | Age: 47
End: 2022-02-02

## 2022-08-16 ENCOUNTER — NON-APPOINTMENT (OUTPATIENT)
Age: 47
End: 2022-08-16

## 2022-08-17 ENCOUNTER — APPOINTMENT (OUTPATIENT)
Dept: PULMONOLOGY | Facility: CLINIC | Age: 47
End: 2022-08-17

## 2022-08-17 VITALS
WEIGHT: 223.13 LBS | TEMPERATURE: 97.3 F | BODY MASS INDEX: 30.22 KG/M2 | DIASTOLIC BLOOD PRESSURE: 81 MMHG | HEIGHT: 72 IN | HEART RATE: 78 BPM | RESPIRATION RATE: 16 BRPM | OXYGEN SATURATION: 99 % | SYSTOLIC BLOOD PRESSURE: 117 MMHG

## 2022-08-17 PROCEDURE — 99213 OFFICE O/P EST LOW 20 MIN: CPT | Mod: GC

## 2022-08-17 NOTE — REVIEW OF SYSTEMS
[EDS: ESS=____] : daytime somnolence: ESS=[unfilled] [Fatigue] : fatigue [Snoring] : snoring [Difficulty Maintaining Sleep] : difficulty maintaining sleep [Negative] : Psychiatric [Nasal Congestion] : no nasal congestion [Postnasal Drip] : no postnasal drip [Shortness Of Breath] : no shortness of breath [Chest Pain] : no chest pain [Difficulty Initiating Sleep] : no difficulty falling asleep [Lower Extremity Discomfort] : no lower extremity discomfort [Late day/ Evening symptoms] : no late day/evening symptoms [Unusual Sleep Behavior] : no unusual sleep behavior

## 2022-08-17 NOTE — ASSESSMENT
[FreeTextEntry1] : 45M hx MARY s/p INSPIRE, who comes for follow up. He was diagnosed with moderate MARY (AHI 18.1) in 9/2019 and was given CPAP, which patient did not tolerate because of uncomfortable mask, claustrophobia, SOB and dry mouth. Also did not tolerate oral appliance. Now s/p INSPIRE on 6/16/2020 by Dr. Corona, activated on 7/21/2020.  He has not been using the inspire device due to perceived intolerance but he has not followed up since.\par \par His tongue pain is unlikely to be due to Inspire as the electrode sheath is far inferior to the area of pain. Additionally, there is no hardware connected to the tongue that could illicit this type of pain. no swelling, erythema or fever noted. The patient may have had sialolithiasis or a dental complication, however the pain has been improving since its onset yesterday. He was instructed to see a dentist if the pain does not resolve.\par \par As for Inspire, he has been instructed to return to clinic next week for Inspire readjustment as long as the tongue pain has resolved.

## 2022-08-17 NOTE — PHYSICAL EXAM
[General Appearance - Well Developed] : well developed [General Appearance - Well Nourished] : well nourished [Normal Conjunctiva] : the conjunctiva exhibited no abnormalities [Heart Rate And Rhythm] : heart rate was normal and rhythm regular [Heart Sounds] : normal S1 and S2 [Exaggerated Use Of Accessory Muscles For Inspiration] : no accessory muscle use [Abnormal Walk] : normal gait [Nail Clubbing] : no clubbing of the fingernails [Cyanosis, Localized] : no localized cyanosis [Skin Color & Pigmentation] : normal skin color and pigmentation [Motor Exam] : the motor exam was normal [No Focal Deficits] : no focal deficits [Oriented To Time, Place, And Person] : oriented to person, place, and time [Mood] : the mood was normal [Neck Appearance] : the appearance of the neck was normal [] : the neck was supple [Neck Cervical Mass (___cm)] : no neck mass was observed [FreeTextEntry1] : no neck swelling

## 2022-08-17 NOTE — HISTORY OF PRESENT ILLNESS
[FreeTextEntry1] : 45M hx MARY s/p INSPIRE, who comes for follow up. Activatied on 7/21/2020. Last seen on 4/8/2021.\par \par Patient presents for acute visit today due to tongue pain. He reports that he started having pain in his tongue yesterday and believes it is due to Inspire. Of note, the patient has not used the device in over a year. He reports he stopped using it because the shock was waking him up in the middle of the night.\par \par Yesterday, the patient woke with tongue pain on the R side of his tongue. Pain feels like pulling on the tongue and is localized to the right side of the tongue and mild tenderness under the right side of the jaw. Pain was relieved by Advil yesterday. Reports the pain is improving today. Denies any fevers, chills, sore throat, sinus congestion, rhinorrhea, cough.\par \par PSG 9/7/2019: AHI 18.1, majority were obstructive. T90 1.2%.

## 2022-08-24 ENCOUNTER — APPOINTMENT (OUTPATIENT)
Dept: PULMONOLOGY | Facility: CLINIC | Age: 47
End: 2022-08-24

## 2022-08-24 VITALS
HEART RATE: 73 BPM | SYSTOLIC BLOOD PRESSURE: 109 MMHG | RESPIRATION RATE: 16 BRPM | BODY MASS INDEX: 30.2 KG/M2 | TEMPERATURE: 97.7 F | WEIGHT: 223 LBS | DIASTOLIC BLOOD PRESSURE: 71 MMHG | HEIGHT: 72 IN

## 2022-08-24 PROCEDURE — 76536 US EXAM OF HEAD AND NECK: CPT | Mod: GC

## 2022-08-24 PROCEDURE — 95977 ALYS CPLX CN NPGT PRGRMG: CPT | Mod: GC

## 2022-08-24 PROCEDURE — 99214 OFFICE O/P EST MOD 30 MIN: CPT | Mod: GC,25

## 2022-08-24 NOTE — REVIEW OF SYSTEMS
[Fatigue] : fatigue [Recent Wt Gain (___ Lbs)] : recent [unfilled] ~Ulb weight gain [Snoring] : snoring [Obesity] : obesity [Negative] : Constitutional [Chest Pain] : no chest pain

## 2022-08-24 NOTE — HISTORY OF PRESENT ILLNESS
[Snoring] : snoring [Witnessed Apneas] : witnessed sleep apnea [Frequent Nocturnal Awakening] : frequent nocturnal awakening [FreeTextEntry1] : 44 yo M PMH moderate MARY (AHI 18.1 9/2019) s/p INSPIRE (activated 7/21/2020) presenting for follow up of sleep apnea. Regarding sleep history noted to have moderate MARY (18.1 9/2019). Unable to tolerate CPAP due to uncomfortable mask and claustrophobia. S/p INSPIRE (6/16/2020 with activation 7/21/2020). Previously stopped inspire in the past for over a year due to perceived intolerance though recently reinitiated. Last seen in clinic 8/17 with symptoms of R sided tongue pain and pulling. Also with associated tenderness under right side of jaw. \par \par Reports that tongue pain has completely resolved since last visit. No further swelling noted. Has not yet restarted inspire therapy. Still with symptoms of non-restorative sleep and significant snoring.\par \par PSG 9/7/2019 AHI 18.1 (primarily obstructive); T90 1.2%.  [Unusual Sleep Behavior] : no unusual sleep behavior [Lower Extremity Discomfort] : no lower extremity discomfort in evening or at bedtime

## 2022-08-24 NOTE — ASSESSMENT
[FreeTextEntry1] : 44 yo M PMH moderate MARY (AHI 18.1 9/2019) s/p INSPIRE presenting for evaluation of INSPIRE therapy. Patient with history of moderate MARY intolerable of CPAP therapy due to mask discomfort and claustrophobia. Also with intolerance to oral appliance. Now s/p INSPIRE on 6/16/2020 by Dr. Corona with activation on 7/21/2020. Settings changed during today's visit as follows:\par \par Sensory threshold of 0.7 V\par Functional threshold of 2.4 V\par Range 0.8-1.8V -> 1.5V - 2.5V\par Start delay: 15 min\par Pause time: 15 min\par Therapy duration: 6 hours\par Pulse Width: 90 us\par Rate: 33 Hz\par Electrode: - - - -> + - +\par \par Patient instructed on appropriate device usage during today's visit. Titration performed with appropriate tongue movement response seen on ultrasound. Recommended to increase voltage settings until cessation of symptoms or intolerable of therapy. Patient will call clinic to follow up post titration.

## 2022-08-24 NOTE — PROCEDURE
[FreeTextEntry1] : Ultrasound of the tongue was performed in the office today. US examination of the hyoid bone showed good anterior motion on a voltage of 1.7V at an electrode configuration of + - +.

## 2022-08-24 NOTE — PHYSICAL EXAM
[General Appearance - Well Developed] : well developed [General Appearance - Well Nourished] : well nourished [Normal Conjunctiva] : the conjunctiva exhibited no abnormalities [Neck Appearance] : the appearance of the neck was normal [Apical Impulse] : the apical impulse was normal [Heart Rate And Rhythm] : heart rate was normal and rhythm regular [Heart Sounds] : normal S1 and S2 [Respiration, Rhythm And Depth] : normal respiratory rhythm and effort [Auscultation Breath Sounds / Voice Sounds] : lungs were clear to auscultation bilaterally [Abdomen Soft] : soft [Abnormal Walk] : normal gait [Nail Clubbing] : no clubbing of the fingernails [Cyanosis, Localized] : no localized cyanosis [] : no rash [No Focal Deficits] : no focal deficits [Oriented To Time, Place, And Person] : oriented to person, place, and time [Impaired Insight] : insight and judgment were intact

## 2022-09-08 NOTE — ASU PREOP CHECKLIST - ISOLATION PRECAUTIONS
Mónica Mathis RN      Device: Medtronic CRT-D      Interpretation:   Presenting rhythm - AF-VS  Total BiVP 77%, AP 5%  Impedances stable  Sensing and threshold testing stable   AF ongoing since 9/5/22  4 VT monitor - which appear to be AF/AFL with RVR  Temporary programming changes made to interrogate device function  Battery - 2.1 years  Normal device function      Dr. Ansari personally reviewed the device interrogation/programming. Normal device function was noted.       none

## 2022-10-05 ENCOUNTER — OUTPATIENT (OUTPATIENT)
Dept: OUTPATIENT SERVICES | Facility: HOSPITAL | Age: 47
LOS: 1 days | End: 2022-10-05
Payer: MEDICAID

## 2022-10-05 ENCOUNTER — APPOINTMENT (OUTPATIENT)
Dept: RADIOLOGY | Facility: CLINIC | Age: 47
End: 2022-10-05

## 2022-10-05 DIAGNOSIS — R07.89 OTHER CHEST PAIN: ICD-10-CM

## 2022-10-05 DIAGNOSIS — Z98.890 OTHER SPECIFIED POSTPROCEDURAL STATES: Chronic | ICD-10-CM

## 2022-10-05 DIAGNOSIS — G47.33 OBSTRUCTIVE SLEEP APNEA (ADULT) (PEDIATRIC): ICD-10-CM

## 2022-10-05 PROCEDURE — 71046 X-RAY EXAM CHEST 2 VIEWS: CPT

## 2022-10-05 PROCEDURE — 71046 X-RAY EXAM CHEST 2 VIEWS: CPT | Mod: 26

## 2022-10-06 ENCOUNTER — APPOINTMENT (OUTPATIENT)
Dept: PULMONOLOGY | Facility: CLINIC | Age: 47
End: 2022-10-06

## 2022-10-06 ENCOUNTER — NON-APPOINTMENT (OUTPATIENT)
Age: 47
End: 2022-10-06

## 2022-10-06 VITALS
HEIGHT: 72 IN | OXYGEN SATURATION: 97 % | HEART RATE: 70 BPM | TEMPERATURE: 97.8 F | RESPIRATION RATE: 16 BRPM | SYSTOLIC BLOOD PRESSURE: 112 MMHG | BODY MASS INDEX: 31.02 KG/M2 | DIASTOLIC BLOOD PRESSURE: 79 MMHG | WEIGHT: 229 LBS

## 2022-10-06 PROCEDURE — 99213 OFFICE O/P EST LOW 20 MIN: CPT | Mod: GC,25

## 2022-10-06 PROCEDURE — 95976 ALYS SMPL CN NPGT PRGRMG: CPT | Mod: GC

## 2022-10-06 NOTE — HISTORY OF PRESENT ILLNESS
[Snoring] : snoring [Witnessed Apneas] : witnessed sleep apnea [Frequent Nocturnal Awakening] : frequent nocturnal awakening [FreeTextEntry1] : This is a 44 y/o M with PMHx of moderate MARY (AHI 18.1 9/2019) s/p INSPIRE (activated 7/21/2020) presenting for follow up of sleep apnea. Regarding sleep history noted to have moderate MARY (18.1 9/2019). Unable to tolerate CPAP due to uncomfortable mask and claustrophobia. S/p INSPIRE (6/16/2020 with activation 7/21/2020). Last seen 8/24/2022: At that time, the patient's device settings were adjusted for comfort and the patient was instructed to increase the voltage as tolerated and schedule an appointment for an HST.\par \par Today, patient presents complaining of chest pain. The pain started 4 days ago and is described as a "tugging" sensation. Feels the pain only on movement and when he is walking, but does not feel anything at rest. The pain does not radiate and is not associated with SOB or pleuritic pain. He denies any trauma to the area or any vigorous exercise over the past few months. He does note a 15 lb weight gain over the last several months.\par \par He reports using the device fairly regularly in the month since he was last seen. He was able to titrate up to a level 4, however he did not notice a difference in the quality of his sleep, his snoring or his daytime sleepiness. He was unable to titrate up further because his remote wasn't working. However, today in the office he is able to turn on the device and titrate up and down on the settings without issue.\par \par Sleep Studies:\par PSG 9/7/2019 AHI 18.1 (primarily obstructive); T90 1.2%.  [Unusual Sleep Behavior] : no unusual sleep behavior [Lower Extremity Discomfort] : no lower extremity discomfort in evening or at bedtime

## 2022-10-06 NOTE — REASON FOR VISIT
[Follow-Up] : a follow-up visit [Sleep Apnea] : sleep apnea [FreeTextEntry2] : Sleep apnea and inspire programming

## 2022-10-06 NOTE — ASSESSMENT
[FreeTextEntry1] : 45 y/o M PMH moderate MARY (AHI 18.1 9/2019) s/p INSPIRE presenting for evaluation of INSPIRE therapy. Patient with history of moderate MARY intolerable of CPAP therapy due to mask discomfort and claustrophobia. Also with intolerance to oral appliance. Now s/p INSPIRE on 6/16/2020 by Dr. Corona with activation on 7/21/2020. Device settings are as follows:\par \par Sensory threshold of 0.7 V\par Functional threshold of 2.4 V\par Range 0.8-1.8V -> 1.5V - 2.5V\par Start delay: 15 min\par Pause time: 15 min\par Therapy duration: 6 hours\par Pulse Width: 90 us\par Rate: 33 Hz\par Electrode: +-+\par \par He complains of a tugging sensation in the chestin the region of the generator impalnt and into his R shoulder, but does not appear to have any other respiratory or cardiac symptoms. His CXR is normal without evidence pulmonary / pleural involvement.  He will be referred to Dr. Corona for evaluation.\par \par Recommended to continue to use the device and increase voltage settings until cessation of symptoms or intolerable of therapy. Patient will call clinic to follow up for HST to document efficacy.

## 2022-10-06 NOTE — PHYSICAL EXAM
[General Appearance - Well Developed] : well developed [General Appearance - Well Nourished] : well nourished [Normal Conjunctiva] : the conjunctiva exhibited no abnormalities [Neck Appearance] : the appearance of the neck was normal [Apical Impulse] : the apical impulse was normal [Heart Rate And Rhythm] : heart rate was normal and rhythm regular [Heart Sounds] : normal S1 and S2 [Respiration, Rhythm And Depth] : normal respiratory rhythm and effort [Auscultation Breath Sounds / Voice Sounds] : lungs were clear to auscultation bilaterally [Abdomen Soft] : soft [Abnormal Walk] : normal gait [Nail Clubbing] : no clubbing of the fingernails [Cyanosis, Localized] : no localized cyanosis [] : no rash [No Focal Deficits] : no focal deficits [Oriented To Time, Place, And Person] : oriented to person, place, and time [Impaired Insight] : insight and judgment were intact [FreeTextEntry1] : mild tenderness to palpation over Inspire device, no erythema or drainage noted

## 2022-10-07 ENCOUNTER — APPOINTMENT (OUTPATIENT)
Dept: OTOLARYNGOLOGY | Facility: CLINIC | Age: 47
End: 2022-10-07

## 2022-10-07 VITALS
SYSTOLIC BLOOD PRESSURE: 125 MMHG | HEART RATE: 84 BPM | DIASTOLIC BLOOD PRESSURE: 76 MMHG | HEIGHT: 72 IN | BODY MASS INDEX: 30.48 KG/M2 | WEIGHT: 225 LBS

## 2022-10-07 DIAGNOSIS — G47.33 OBSTRUCTIVE SLEEP APNEA (ADULT) (PEDIATRIC): ICD-10-CM

## 2022-10-07 DIAGNOSIS — J35.1 HYPERTROPHY OF TONSILS: ICD-10-CM

## 2022-10-07 DIAGNOSIS — R07.89 OTHER CHEST PAIN: ICD-10-CM

## 2022-10-07 PROCEDURE — 99215 OFFICE O/P EST HI 40 MIN: CPT | Mod: 25,57

## 2022-10-07 PROCEDURE — 31575 DIAGNOSTIC LARYNGOSCOPY: CPT

## 2022-10-07 RX ORDER — MOMETASONE 50 UG/1
50 SPRAY, METERED NASAL DAILY
Qty: 1 | Refills: 5 | Status: COMPLETED | COMMUNITY
Start: 2021-03-05 | End: 2022-10-07

## 2022-10-07 NOTE — CONSULT LETTER
[Dear  ___] : Dear  [unfilled], [Consult Letter:] : I had the pleasure of evaluating your patient, [unfilled]. [Please see my note below.] : Please see my note below. [Consult Closing:] : Thank you very much for allowing me to participate in the care of this patient.  If you have any questions, please do not hesitate to contact me. [Sincerely,] : Sincerely, [FreeTextEntry3] : Luke Corona MD, NORRIS, FACS\par  Department Otolaryngology\par Director of [facility]\par Professor of Otolaryngology, \par [employer]\par

## 2022-10-07 NOTE — HISTORY OF PRESENT ILLNESS
[de-identified] : 46 year old male follow up s/p Inspire implant 6/16/2020. States discomfort in chest area, "tugging" sensation where implant is started 10/2/22. Denies radiating pain. Denies trauma or injury to the area. Xray 10/5/22- Impression: No radiographic evidence of active chest disease. Reports snoring at night with pausing, gasping, choking, waking up at night. Last sleep study 9/2019. States saw Dr. Gurvinder Sanchez yesterday, was able to turn on device, but went to turn on device last night and it wasn't working.

## 2022-10-07 NOTE — PROCEDURE
[Video Captured] : video captured and filed [Image(s) Captured] : image(s) captured and filed [Unable to Cooperate with Mirror] : patient unable to cooperate with mirror [Obstruction] : acute airway obstruction evaluation [Complicated Symptoms] : complicated symptoms requiring more thorough examination than provided by mirror [Topical Lidocaine] : topical lidocaine [Oxymetazoline HCl] : oxymetazoline HCl [Flexible Endoscope] : examined with the flexible endoscope [Serial Number: ___] : Serial Number: [unfilled] [Velopharynx ___ %] : the velopharynx was [unfilled]U% collapsed [True Vocal Cords Paralysis] : no true vocal cord paralysis [True Vocal Cords Erythematous] : no true vocal cord edema [True Vocal Cords Schilling's Nodules] : no true vocal cord nodules [Glottis Arytenoid Cartilages] : no arytenoid granulomas [Glottis Arytenoid Cartilages Erythema] : no arytenoid erythema [Normal] : normal [Arytenoid Edema ___ /4] : bilaterally were normal [Arytenoid Erythema ___ /4] : bilaterally were normal [de-identified] : Patient was placed in the examination chair in a sitting position. The nose was decongested with oxymetazoline nasal solution. The airway was anesthetized with 4% Xylocaine.  The back of the throat was anesthetized with Cetacaine. Direct flexible/rigid video endoscopy was performed. Findings revealed:\par Patient has a positive Rocha maneuver on inspiration.  Airway closes to a pinpoint.  Oropharynx patient has 2+ tonsils hypopharynx is now blocked by lingual tonsillar hypertrophy that was not present 2 years ago at the time of implant.  The lingual tonsils are seen pushing the epiglottis posteriorly towards the posterior pharyngeal wall narrowing the airway [FreeTextEntry3] : + Rocha [FreeTextEntry4] : Obstructed by lingual tonsils [de-identified] : Lingual tonsil hypertrophy [de-identified] : Obliterated by lingual tonsils  (normal spontaneous vaginal delivery)

## 2022-10-07 NOTE — PHYSICAL EXAM
[Midline] : trachea located in midline position [Laryngoscopy Performed] : laryngoscopy was performed, see procedure section for findings [Normal] : no rashes [FreeTextEntry1] : Patient's snoring has come back according to the wife not sure that the device is working discomfort and pulling in the right chest when he moves his arm [de-identified] : New lingual tonsil hypertrophy closing of the hypopharyngeal airway [de-identified] : 2+ [de-identified] : Positive Rocha maneuver

## 2022-10-07 NOTE — REASON FOR VISIT
[Subsequent Evaluation] : a subsequent evaluation for [FreeTextEntry2] : follow up s/p Inspire implant 6/16/2020.

## 2022-10-18 NOTE — H&P PST ADULT - NSICDXPASTMEDICALHX_GEN_ALL_CORE_FT
PAST MEDICAL HISTORY:  MARY (obstructive sleep apnea) not able to tolerate c-pap, s/p Endoscopical eval 3/20    Smoking 10 pack per year PAST MEDICAL HISTORY:  MARY (obstructive sleep apnea) not able to tolerate c-pap, s/p Endoscopy eval 3/20    Smoking 10 pack per year Fluconazole Counseling:  Patient counseled regarding adverse effects of fluconazole including but not limited to headache, diarrhea, nausea, upset stomach, liver function test abnormalities, taste disturbance, and stomach pain.  There is a rare possibility of liver failure that can occur when taking fluconazole.  The patient understands that monitoring of LFTs and kidney function test may be required, especially at baseline. The patient verbalized understanding of the proper use and possible adverse effects of fluconazole.  All of the patient's questions and concerns were addressed.

## 2022-11-21 ENCOUNTER — FORM ENCOUNTER (OUTPATIENT)
Age: 47
End: 2022-11-21

## 2022-11-21 ENCOUNTER — APPOINTMENT (OUTPATIENT)
Dept: SLEEP CENTER | Facility: CLINIC | Age: 47
End: 2022-11-21

## 2022-11-21 ENCOUNTER — OUTPATIENT (OUTPATIENT)
Dept: OUTPATIENT SERVICES | Facility: HOSPITAL | Age: 47
LOS: 1 days | End: 2022-11-21
Payer: MEDICAID

## 2022-11-21 DIAGNOSIS — Z98.890 OTHER SPECIFIED POSTPROCEDURAL STATES: Chronic | ICD-10-CM

## 2022-11-21 PROCEDURE — 95977 ALYS CPLX CN NPGT PRGRMG: CPT

## 2022-11-21 PROCEDURE — 95810 POLYSOM 6/> YRS 4/> PARAM: CPT

## 2022-11-21 PROCEDURE — 95810 POLYSOM 6/> YRS 4/> PARAM: CPT | Mod: 26

## 2022-12-02 ENCOUNTER — APPOINTMENT (OUTPATIENT)
Dept: PULMONOLOGY | Facility: CLINIC | Age: 47
End: 2022-12-02

## 2022-12-02 VITALS
TEMPERATURE: 97.9 F | HEIGHT: 72 IN | RESPIRATION RATE: 16 BRPM | WEIGHT: 240 LBS | HEART RATE: 88 BPM | SYSTOLIC BLOOD PRESSURE: 122 MMHG | BODY MASS INDEX: 32.51 KG/M2 | DIASTOLIC BLOOD PRESSURE: 76 MMHG

## 2022-12-02 PROCEDURE — 95976 ALYS SMPL CN NPGT PRGRMG: CPT | Mod: GC

## 2022-12-02 NOTE — ASSESSMENT
[FreeTextEntry1] : 45 y/o M PMH moderate MARY (AHI 18.1 9/2019) s/p INSPIRE presenting for evaluation of INSPIRE therapy. Patient with history of moderate MARY intolerable of CPAP therapy due to mask discomfort and claustrophobia. Also with intolerance to oral appliance. Now s/p INSPIRE on 6/16/2020 by Dr. Corona with activation on 7/21/2020.\par \par Advanced Inspire titration was performed on 11/21/2022, and while no Inspire setting resulted in normalization of his AHI, flow was more stable with an adequate reduction in AHI for a portion of the night on an electrode configuration of 0-0 with a voltage of 1.4-1.5V.\par \par Impedance and sensing diagnostics were performed in the office today and were within normal limits. The device appears to be functioning well. His Inspire device was adjusted to the following settings:\par \par Sensory threshold of 0.7 V\par Functional threshold of 2.4 V\par Range: 1.5V - 2.5V -> 1.2V - 2.1V (1.4V)\par Start delay: 30 min -> 40 min\par Pause time: 15 min\par Therapy duration: 8 hours\par Pulse Width: 90 us\par Rate: 33 Hz\par Electrode: +-+ -> 0-0\par \par He will start to use Inspire at the adjusted settings starting tonight. We will perform an HST to confirm efficacy of the new settings. If this adjustment does not result in a successful treatment of his MARY, he was advised to make an appointment with Dr. Corona to discuss removal of his enlarged lingual tonsils. Alternatively, he may benefit from OAT in conjunction with Inspire.\par \par He will follow up after the sleep study to discuss results.

## 2022-12-02 NOTE — HISTORY OF PRESENT ILLNESS
[Snoring] : snoring [Witnessed Apneas] : witnessed sleep apnea [Frequent Nocturnal Awakening] : frequent nocturnal awakening [FreeTextEntry1] : This is a 46 y/o M with PMHx of moderate MARY (AHI 18.1 9/2019) s/p INSPIRE (activated 7/21/2020) presenting for follow up of sleep apnea. Regarding sleep history noted to have moderate MARY (18.1 9/2019). Unable to tolerate CPAP due to uncomfortable mask and claustrophobia. S/p INSPIRE (6/16/2020 with activation 7/21/2020). Last seen 10/6/2022.\par \par In the interim, patient had an advanced Inspire titration performed at the sleep lab. There was no normalization of his AHI with Inspire, however there was a period during the night with more stable flow on an electrode configuration of 0-0 at 1.4-1.5V.\par \par Patient returns today for Inspire programming. He has not been using his device since the study. His settings were reverted back to his previous settings after study completion. He still reports excessive daytime sleepiness, nonrestorative sleep and disrupted sleep.\par \par Sleep Studies:\par PSG 9/7/2019 AHI 18.1 (primarily obstructive); T90 1.2%\par Inspire titration 11/21/2022: Best tested setting with electrode configuration 0-0 1.4-1.5V [Unusual Sleep Behavior] : no unusual sleep behavior [Lower Extremity Discomfort] : no lower extremity discomfort in evening or at bedtime

## 2022-12-02 NOTE — REVIEW OF SYSTEMS
[Fatigue] : fatigue [Recent Wt Gain (___ Lbs)] : recent [unfilled] ~Ulb weight gain [Snoring] : snoring [Obesity] : obesity [Negative] : Psychiatric [Chest Pain] : no chest pain

## 2022-12-02 NOTE — PHYSICAL EXAM
[General Appearance - Well Developed] : well developed [General Appearance - Well Nourished] : well nourished [Normal Conjunctiva] : the conjunctiva exhibited no abnormalities [Neck Appearance] : the appearance of the neck was normal [Abnormal Walk] : normal gait [No Focal Deficits] : no focal deficits [Neck Cervical Mass (___cm)] : no neck mass was observed [] : no respiratory distress [Exaggerated Use Of Accessory Muscles For Inspiration] : no accessory muscle use [Nail Clubbing] : no clubbing  or cyanosis of the fingernails [Affect] : the affect was normal [Mood] : the mood was normal

## 2022-12-28 DIAGNOSIS — G47.33 OBSTRUCTIVE SLEEP APNEA (ADULT) (PEDIATRIC): ICD-10-CM

## 2023-09-08 ENCOUNTER — APPOINTMENT (OUTPATIENT)
Dept: ORTHOPEDIC SURGERY | Facility: CLINIC | Age: 48
End: 2023-09-08
Payer: MEDICAID

## 2023-09-08 ENCOUNTER — NON-APPOINTMENT (OUTPATIENT)
Age: 48
End: 2023-09-08

## 2023-09-08 DIAGNOSIS — M72.2 PLANTAR FASCIAL FIBROMATOSIS: ICD-10-CM

## 2023-09-08 PROCEDURE — 99203 OFFICE O/P NEW LOW 30 MIN: CPT

## 2023-09-08 NOTE — DISCUSSION/SUMMARY
[de-identified] : Assessment: Right foot Plantar Fasciitis.  Plan: #1. Anti-inflammatories/Tylenol as needed for pain. #2. Home stretching program/physical therapy prescription given. #3. Dr. Maldonado's insoles/gel heel cups. #4. Epsom Salt Baths daily. #5. Dorsal Night Splint.  Use as instructed/as directed.  #4. Follow up in 6-8 weeks as needed. #5. All questions answered.  The patient understood the treatment plan above.

## 2023-09-08 NOTE — PHYSICAL EXAM
[de-identified] : Right Foot Physical Examination:  General: Alert and oriented x3.  In no acute distress.  Pleasant in nature with a normal affect.  No apparent respiratory distress.  Erythema, Warmth, Rubor: Negative Swelling: Negative  ROM Ankle: 1. Dorsiflexion: 10 degrees 2. Plantarflexion: 40 degrees 3. Inversion: 30 degrees 4. Eversion: 30 degrees  ROM of digits: Normal  Pes Planus: Negative Pes Cavus: Negative  Bunion: Negative Tailor's Bunion (Bunionette): Negative Hammer Toe Deformity/Deformities: Negative  Tenderness to Palpation:  1. Heel Pain: Negative 2. Midfoot Pain: Negative 3. First MTP Joint: Negative 4. Lis Franc Joint: Negative  Tenderness Metatarsals: 1st MT: Negative 2nd MT: Negative 3rd MT: Negative 4th MT: Negative 5th MT: Negative Base of the 5th MT: Negative  Ligament Pain: 1. Lis Franc Ligament: Negative 2. Plantar Fascia Ligament: +  Strength:  5/5 TA/GS/EHL/FHL/EDL/ADD/ABD  Pulses: 2+ DP/PT Pulses  Capillary Refill Toes: <2 seconds  Neuro: Intact motor and sensory throughout  Additional Test: 1. Nagel's Squeeze Test: Negative 2. Calcaneal Squeeze Test: Negative [de-identified] : Office Location: [address] [place] [address] Office Phone: [phone] Office Fax: [phone] PATIENT NAME: Diego Sarah PATIENT PHONE NUMBER: [phone] PATIENT ID: [id number] : 1975 DATE OF EXAM: 2023 R. Phys. Name: Jayjay Barber R. Phys. Address: [address] [place] [address] R. Phys. Phone: [phone] RIGHT FOOT XRAY COMPLETE  HISTORY: Right foot pain M79.671   Views: AP, lateral and oblique.  Visualized distal right tibia and fibula demonstrate intact cortical margins with no evidence of an acute fracture.  The right talus, calcaneus, tarsal bones, metatarsals and phalanges demonstrate intact cortical margins with no evidence of an acute fracture.  There are no significant degenerative changes.  There is no appreciable soft tissue swelling.  There is no hallux valgus.  The plantar arch is well maintained.  There is no calcaneal plantar spur.  IMPRESSION:   Right foot radiographs are normal.  There are no acute fractures  Signed by: Steven Mendelsohn MD Signed Date: 2023 1:20 PM EDT    SIGNED BY: Steven Mendelsohn, M.D., Ext. [id number] 2023 01:20 PM  IMPRESSION:   Right foot radiographs are normal.  There are no acute fractures  Signed by: Steven Mendelsohn MD Signed Date: 2023 1:20 PM EDT    SIGNED BY: Steven Mendelsohn, M.D., Ext. [id number] 2023 01:20 PM

## 2023-09-08 NOTE — HISTORY OF PRESENT ILLNESS
[FreeTextEntry1] : The patient is a 47-year-old male who presents with right heel pain.  The right heel pain began approximately 1 to 2 months ago.  He states that when he walks he has bottom of the heel pain.  No numbness or tingling to the heel or feet.  Patient wearing regular sneakers, walking without assistance with a limp.  No other complaints.  No pain at rest.

## 2024-03-22 NOTE — ASU DISCHARGE PLAN (ADULT/PEDIATRIC) - DRIVING
----- Message from Eran Reed sent at 3/22/2024  3:00 PM CDT -----  Contact: Mom [phone]  Would like to receive medical advice.    Would they like a call back or a response via [facility]:  call back   Additional information:        Calling to check the status of the pt placement on wait list. Please call back to advise.     Yes

## 2024-11-24 ENCOUNTER — NON-APPOINTMENT (OUTPATIENT)
Age: 49
End: 2024-11-24

## 2025-01-11 NOTE — ASU PREOP CHECKLIST - ORDERS/MEDICATION ADMINISTRATION RECORD ON CHART
Initial / Assessment/Plan of Care Note     Baseline Assessment  66 year old admitted 11/15/2020 as Observation with a diagnosis of uti.   Prior to admission patient was living with Alone(Caregiver 6hrs/day, 7 days/wk.) and residing at [facility].  Patient’s Primary Care Provider is Dylan Ford MD.     Medical History  Past Medical History:   Diagnosis Date   • Anxiety    • Arthritis    • COPD (chronic obstructive pulmonary disease) (CMS/Formerly [facility])    • Crohn's disease (CMS/[facility]ly [facility])    • Depression    • Diabetes mellitus (CMS/[facility]ly [facility])    • Essential (primary) hypertension    • Fibromyalgia    • Fibromyalgia    • Fibromyalgia    • Frequent UTI    • Gastroesophageal reflux disease    • History of blood clots     right leg   • History of gastric ulcer    • Hydradenitis    • Hypothyroid    • MRSA (methicillin resistant Staphylococcus aureus)     History in the urine;  received antibiotics   • On home oxygen therapy     continous at 3 lpm/nasal cannula   • Pulmonary embolus (CMS/[facility]ly [facility])     off of blood thinners since approximately April or MAy 2020   • Stricture or kinking of ureter     bilateral   • Urinary incontinence    • Urinary tract infection    • Wound, open axilla     right ; open to air       Prior to Admission Status  Verified Living arrangments as above.  Functional Status  Ambulation: Other (comment)(bedbound)  Bathing: Aid/PCW  Dressing: Aid/PCW  Toileting: Aid/PCW  Meal Preparation: Other (comment)(Aide)  Shopping: Other (comment)(Aide)  Medication Preparation: Home Care Setup, Pharmacy Setup  Medication Administration: Independent/Self  Housekeeping: Other (comment)(aide)  Laundry: Other (comment)(aide)  Transportation: Other (comment)(ems)    Agency/Support  Type of Services Prior to Hospitalization: Other (comment)(Caregiver 6hrs/day, 7 days/wk thru [facility]. )  Support Systems: Family members, Home Care Staff  Home Devices/Equipment: Blood glucose monitor  Mobility Assist Devices: None  Sensory  Support Devices: Eyeglasses      Insurance  Primary: MEDICARE  Secondary: [place] MEDICAID    Barriers to Discharge  Identified Barriers to Discharge/Transition Planning:      Progress Note  Spoke with caregiver, Melinda, to assess for needs at discharge.  Introduced and explained role of CM/Discharge planner. Provided Melinda with CM/SW contact info and received.   Supplemental information given on planning for discharge.  Melinda verbalized understanding that Care Management team  will continue to follow and reevaluate for discharge needs.   Discussed management of pt's health prior to hospital admission.  Melinda verbalized a good understanding of the things she is responsible for  in managing pt's health.        Plan  SW/CM - Recommendations for Discharge: SNF    Anticipate patient will need post-hospital services. Necessary services are available.  Anticipate patient cannot return to the environment from which patient entered the hospital.   Anticipate patient cannot provide self-care at discharge.    Refer to SW/CM Flowsheet for Goals and objective data.          Lactated [employer] 30mL/hr/done minutes on the discharge service.